# Patient Record
Sex: FEMALE | Race: WHITE | NOT HISPANIC OR LATINO
[De-identification: names, ages, dates, MRNs, and addresses within clinical notes are randomized per-mention and may not be internally consistent; named-entity substitution may affect disease eponyms.]

---

## 2018-06-07 ENCOUNTER — APPOINTMENT (OUTPATIENT)
Dept: NUCLEAR MEDICINE | Facility: HOSPITAL | Age: 83
End: 2018-06-07

## 2018-06-12 ENCOUNTER — OUTPATIENT (OUTPATIENT)
Dept: OUTPATIENT SERVICES | Facility: HOSPITAL | Age: 83
LOS: 1 days | End: 2018-06-12
Payer: MEDICARE

## 2018-06-12 ENCOUNTER — APPOINTMENT (OUTPATIENT)
Dept: NUCLEAR MEDICINE | Facility: HOSPITAL | Age: 83
End: 2018-06-12

## 2018-06-12 DIAGNOSIS — R27.0 ATAXIA, UNSPECIFIED: ICD-10-CM

## 2018-06-12 PROCEDURE — 78607: CPT | Mod: 26

## 2018-06-13 ENCOUNTER — APPOINTMENT (OUTPATIENT)
Dept: NUCLEAR MEDICINE | Facility: HOSPITAL | Age: 83
End: 2018-06-13

## 2020-10-27 ENCOUNTER — NON-APPOINTMENT (OUTPATIENT)
Age: 85
End: 2020-10-27

## 2020-10-27 DIAGNOSIS — D72.819 DECREASED WHITE BLOOD CELL COUNT, UNSPECIFIED: ICD-10-CM

## 2020-10-27 DIAGNOSIS — L40.9 PSORIASIS, UNSPECIFIED: ICD-10-CM

## 2020-10-27 DIAGNOSIS — Z63.4 DISAPPEARANCE AND DEATH OF FAMILY MEMBER: ICD-10-CM

## 2020-10-27 DIAGNOSIS — M35.3 POLYMYALGIA RHEUMATICA: ICD-10-CM

## 2020-10-27 DIAGNOSIS — I45.9 CONDUCTION DISORDER, UNSPECIFIED: ICD-10-CM

## 2020-10-27 DIAGNOSIS — R41.3 OTHER AMNESIA: ICD-10-CM

## 2020-10-27 DIAGNOSIS — Z86.19 PERSONAL HISTORY OF OTHER INFECTIOUS AND PARASITIC DISEASES: ICD-10-CM

## 2020-10-27 DIAGNOSIS — I10 ESSENTIAL (PRIMARY) HYPERTENSION: ICD-10-CM

## 2020-10-27 DIAGNOSIS — M48.061 SPINAL STENOSIS, LUMBAR REGION WITHOUT NEUROGENIC CLAUDICATION: ICD-10-CM

## 2020-10-27 DIAGNOSIS — R48.2 APRAXIA: ICD-10-CM

## 2020-10-27 DIAGNOSIS — E78.5 HYPERLIPIDEMIA, UNSPECIFIED: ICD-10-CM

## 2020-10-27 DIAGNOSIS — Z84.89 FAMILY HISTORY OF OTHER SPECIFIED CONDITIONS: ICD-10-CM

## 2020-10-27 SDOH — SOCIAL STABILITY - SOCIAL INSECURITY: DISSAPEARANCE AND DEATH OF FAMILY MEMBER: Z63.4

## 2020-11-17 ENCOUNTER — APPOINTMENT (OUTPATIENT)
Dept: NEUROLOGY | Facility: CLINIC | Age: 85
End: 2020-11-17
Payer: MEDICARE

## 2020-11-17 DIAGNOSIS — H81.10 BENIGN PAROXYSMAL VERTIGO, UNSPECIFIED EAR: ICD-10-CM

## 2020-11-17 DIAGNOSIS — Z87.891 PERSONAL HISTORY OF NICOTINE DEPENDENCE: ICD-10-CM

## 2020-11-17 PROCEDURE — 99214 OFFICE O/P EST MOD 30 MIN: CPT | Mod: 95

## 2020-11-17 RX ORDER — SIMVASTATIN 10 MG/1
10 TABLET, FILM COATED ORAL
Qty: 30 | Refills: 0 | Status: ACTIVE | COMMUNITY
Start: 2020-10-14

## 2020-11-17 NOTE — CONSULT LETTER
[Dear  ___] : Dear  [unfilled], [Consult Letter:] : I had the pleasure of evaluating your patient, [unfilled]. [Please see my note below.] : Please see my note below. [Consult Closing:] : Thank you very much for allowing me to participate in the care of this patient.  If you have any questions, please do not hesitate to contact me. [Sincerely,] : Sincerely, [FreeTextEntry3] : John Mendez MD\par

## 2020-11-17 NOTE — PHYSICAL EXAM
[FreeTextEntry1] : She was alert and cooperative.  Speech was fluent.  Eye movements were full.  Facial strength was normal.  She protruded her tongue in the midline.  She moves her limbs well.  There was no finger to nose dysmetria.  She ambulated with the use of a walker with a small step to stooped gait.

## 2020-11-17 NOTE — ASSESSMENT
[FreeTextEntry1] : Mrs. 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  Her vestibular symptoms resolved after canalith repositioning.  She is doing well cognitively.  I am very concerned about her recent shortness of breath.  She has a pacemaker but no history of coronary or pulmonary disease.  I suggested that she seek immediate medical attention to address this very important issue.  She is under care of the [facility] at [address] [place] [address] in [place].  I suggested a routine follow-up visit in 4 months.  She should take all precautions to avoid falling.  Further management will depend upon her clinical course.

## 2020-11-17 NOTE — HISTORY OF PRESENT ILLNESS
[FreeTextEntry1] : Mrs. Eduarda Ardon was last evaluated on August 20, 2020 via telemedicine. She is an 87-year-old right-handed patient with subacute ataxia. There was no evidence of vascular parkinsonism, idiopathic or atypical Parkinson's, hydrocephalus or myelopathy. Serologies were unremarkable. CALI antibody was negative. Nerve conduction studies did not reveal significant sensorimotor neuropathy. CT of the lumbar spine revealed moderate central stenosis at L4-5. CT of the cervical spine revealed mild to moderate central canal stenosis at C3-4. CT of the brain revealed prominent parasagittal frontal lobe atrophy.\par \par When last seen, Mrs. Ardon had experienced vertigo.  She was evaluated by her vestibular therapist who performed a canalith repositioning with resolution.\par \par Mrs. Ardon has remained neurologically stable.  She requires use of a walker.  She has bladder dysfunction.  She denies any cognitive difficulties.  Of note, she has experienced shortness of breath over the past few days without chest pain or palpitations.  She has not sought medical attention.  She is undergoing home physical therapy twice a week.

## 2020-11-17 NOTE — REASON FOR VISIT
[Home] : at home, [unfilled] , at the time of the visit. [Medical Office: ([facility])___] : at the medical office located in  [Family Member] : family member [Verbal consent obtained from patient] : the patient, [unfilled]

## 2021-04-01 ENCOUNTER — NON-APPOINTMENT (OUTPATIENT)
Age: 86
End: 2021-04-01

## 2021-07-19 ENCOUNTER — APPOINTMENT (OUTPATIENT)
Dept: NEUROLOGY | Facility: CLINIC | Age: 86
End: 2021-07-19
Payer: MEDICARE

## 2021-07-19 DIAGNOSIS — R27.0 ATAXIA, UNSPECIFIED: ICD-10-CM

## 2021-07-19 DIAGNOSIS — I48.91 UNSPECIFIED ATRIAL FIBRILLATION: ICD-10-CM

## 2021-07-19 PROCEDURE — 99213 OFFICE O/P EST LOW 20 MIN: CPT | Mod: 95

## 2021-07-19 RX ORDER — METOPROLOL SUCCINATE 50 MG/1
50 TABLET, EXTENDED RELEASE ORAL
Qty: 90 | Refills: 0 | Status: ACTIVE | COMMUNITY
Start: 2021-07-09

## 2021-07-19 RX ORDER — DIGOXIN 125 UG/1
125 TABLET ORAL
Qty: 90 | Refills: 0 | Status: ACTIVE | COMMUNITY
Start: 2021-05-12

## 2021-07-19 RX ORDER — FUROSEMIDE 20 MG/1
20 TABLET ORAL
Qty: 90 | Refills: 0 | Status: DISCONTINUED | COMMUNITY
Start: 2021-04-23

## 2021-07-19 RX ORDER — HYDROCORTISONE 25 MG/G
2.5 CREAM TOPICAL
Qty: 28 | Refills: 0 | Status: DISCONTINUED | COMMUNITY
Start: 2021-07-11

## 2021-07-19 RX ORDER — APIXABAN 5 MG/1
5 TABLET, FILM COATED ORAL
Qty: 60 | Refills: 0 | Status: ACTIVE | COMMUNITY
Start: 2021-06-29

## 2021-07-19 RX ORDER — VERAPAMIL HYDROCHLORIDE 180 MG/1
180 TABLET ORAL
Qty: 180 | Refills: 0 | Status: ACTIVE | COMMUNITY
Start: 2021-05-12

## 2021-07-19 RX ORDER — KETOCONAZOLE 20 MG/G
2 CREAM TOPICAL
Qty: 60 | Refills: 0 | Status: DISCONTINUED | COMMUNITY
Start: 2021-07-09

## 2021-07-19 RX ORDER — FLUCONAZOLE 200 MG/1
200 TABLET ORAL
Qty: 30 | Refills: 0 | Status: DISCONTINUED | COMMUNITY
Start: 2021-01-18

## 2021-07-19 NOTE — ASSESSMENT
[FreeTextEntry1] : Mrs. Ardon is an 87-year-old with chronic gait difficulties.  She is doing well cognitively.  CT imaging of the brain revealed frontal atrophy.  DaTscan was normal.  She is very stable neurologically according to her daughter.  She does not wish to consider any intervention or testing at this time.  She will arrange a follow-up visit in the fall 2021 or winter 2022.  Further management will depend upon her clinical course.

## 2021-07-19 NOTE — REVIEW OF SYSTEMS
[Shortness Of Breath] : shortness of breath [Negative] : Heme/Lymph [Difficulty Walking] : difficulty walking [Incontinence] : incontinence

## 2021-07-19 NOTE — REASON FOR VISIT
[Home] : at home, [unfilled] , at the time of the visit. [Medical Office: ([facility])___] : at the medical office located in  [Family Member] : family member [Verbal consent obtained from patient] : the patient, [unfilled]

## 2021-07-19 NOTE — PHYSICAL EXAM
[FreeTextEntry1] : She was alert and cooperative.  Speech was fluent.  Eye movements were full.  Facial strength was normal but there was mild hypomimia.  She protruded her tongue in the midline.  She moves her limbs well.  There was no finger to nose dysmetria.  She ambulated with the use of a walker.  She appeared fairly stable.

## 2021-07-19 NOTE — CONSULT LETTER
[Consult Letter:] : I had the pleasure of evaluating your patient, [unfilled]. [Please see my note below.] : Please see my note below. [Consult Closing:] : Thank you very much for allowing me to participate in the care of this patient.  If you have any questions, please do not hesitate to contact me. [Sincerely,] : Sincerely, [Dear  ___] : Dear  [unfilled], [FreeTextEntry3] : John Mendez MD\par